# Patient Record
Sex: MALE | Race: WHITE | NOT HISPANIC OR LATINO | URBAN - METROPOLITAN AREA
[De-identification: names, ages, dates, MRNs, and addresses within clinical notes are randomized per-mention and may not be internally consistent; named-entity substitution may affect disease eponyms.]

---

## 2024-10-31 ENCOUNTER — HOSPITAL ENCOUNTER (OUTPATIENT)
Dept: RADIOLOGY | Facility: CLINIC | Age: 29
Discharge: HOME | End: 2024-10-31
Payer: COMMERCIAL

## 2024-10-31 ENCOUNTER — OFFICE VISIT (OUTPATIENT)
Dept: SPORTS MEDICINE | Facility: CLINIC | Age: 29
End: 2024-10-31
Payer: COMMERCIAL

## 2024-10-31 VITALS
SYSTOLIC BLOOD PRESSURE: 120 MMHG | WEIGHT: 160.05 LBS | DIASTOLIC BLOOD PRESSURE: 80 MMHG | BODY MASS INDEX: 22.41 KG/M2 | HEIGHT: 71 IN | HEART RATE: 70 BPM

## 2024-10-31 DIAGNOSIS — Z87.39 HISTORY OF HERNIATED INTERVERTEBRAL DISC: ICD-10-CM

## 2024-10-31 DIAGNOSIS — M54.50 LUMBOSACRAL PAIN: ICD-10-CM

## 2024-10-31 DIAGNOSIS — G57.02 PIRIFORMIS SYNDROME OF LEFT SIDE: ICD-10-CM

## 2024-10-31 DIAGNOSIS — M48.07 NEUROFORAMINAL STENOSIS OF LUMBOSACRAL SPINE: ICD-10-CM

## 2024-10-31 DIAGNOSIS — M21.70 LEG LENGTH DIFFERENCE, ACQUIRED: ICD-10-CM

## 2024-10-31 DIAGNOSIS — M99.03 LUMBAR REGION SOMATIC DYSFUNCTION: ICD-10-CM

## 2024-10-31 DIAGNOSIS — M54.50 LUMBAR PAIN: ICD-10-CM

## 2024-10-31 DIAGNOSIS — M62.9 HAMSTRING TIGHTNESS OF BOTH LOWER EXTREMITIES: ICD-10-CM

## 2024-10-31 DIAGNOSIS — M46.46 DISCITIS OF LUMBAR REGION: ICD-10-CM

## 2024-10-31 DIAGNOSIS — M51.369 BULGING LUMBAR DISC: ICD-10-CM

## 2024-10-31 DIAGNOSIS — M99.04 SACRAL REGION SOMATIC DYSFUNCTION: ICD-10-CM

## 2024-10-31 DIAGNOSIS — M54.42 ACUTE LEFT-SIDED LOW BACK PAIN WITH LEFT-SIDED SCIATICA: ICD-10-CM

## 2024-10-31 DIAGNOSIS — M99.04 SOMATIC DYSFUNCTION OF SACRAL SPINE: Primary | ICD-10-CM

## 2024-10-31 DIAGNOSIS — M25.552 LEFT HIP PAIN: ICD-10-CM

## 2024-10-31 DIAGNOSIS — S39.012A LUMBOSACRAL STRAIN, INITIAL ENCOUNTER: ICD-10-CM

## 2024-10-31 DIAGNOSIS — M24.559 HIP FLEXOR TENDON TIGHTNESS, UNSPECIFIED LATERALITY: ICD-10-CM

## 2024-10-31 DIAGNOSIS — M47.26 OSTEOARTHRITIS OF SPINE WITH RADICULOPATHY, LUMBAR REGION: ICD-10-CM

## 2024-10-31 DIAGNOSIS — S76.012A HIP STRAIN, LEFT, INITIAL ENCOUNTER: ICD-10-CM

## 2024-10-31 PROBLEM — M47.816 DEGENERATIVE JOINT DISEASE (DJD) OF LUMBAR SPINE: Status: ACTIVE | Noted: 2024-10-31

## 2024-10-31 PROCEDURE — 72170 X-RAY EXAM OF PELVIS: CPT

## 2024-10-31 PROCEDURE — 1036F TOBACCO NON-USER: CPT | Performed by: FAMILY MEDICINE

## 2024-10-31 PROCEDURE — 72110 X-RAY EXAM L-2 SPINE 4/>VWS: CPT

## 2024-10-31 PROCEDURE — 96372 THER/PROPH/DIAG INJ SC/IM: CPT | Performed by: FAMILY MEDICINE

## 2024-10-31 PROCEDURE — 3008F BODY MASS INDEX DOCD: CPT | Performed by: FAMILY MEDICINE

## 2024-10-31 PROCEDURE — 99214 OFFICE O/P EST MOD 30 MIN: CPT | Performed by: FAMILY MEDICINE

## 2024-10-31 PROCEDURE — 99204 OFFICE O/P NEW MOD 45 MIN: CPT | Performed by: FAMILY MEDICINE

## 2024-10-31 PROCEDURE — 2500000004 HC RX 250 GENERAL PHARMACY W/ HCPCS (ALT 636 FOR OP/ED): Performed by: FAMILY MEDICINE

## 2024-10-31 RX ORDER — KETOROLAC TROMETHAMINE 30 MG/ML
30 INJECTION, SOLUTION INTRAMUSCULAR; INTRAVENOUS ONCE
Status: COMPLETED | OUTPATIENT
Start: 2024-10-31 | End: 2024-10-31

## 2024-10-31 RX ORDER — METHYLPREDNISOLONE SODIUM SUCCINATE 125 MG/2ML
125 INJECTION INTRAMUSCULAR; INTRAVENOUS ONCE
Status: COMPLETED | OUTPATIENT
Start: 2024-10-31 | End: 2024-10-31

## 2024-10-31 RX ORDER — PREDNISONE 20 MG/1
TABLET ORAL
Qty: 30 TABLET | Refills: 0 | Status: SHIPPED | OUTPATIENT
Start: 2024-10-31 | End: 2024-11-09

## 2024-10-31 RX ORDER — METHYLPREDNISOLONE ACETATE 40 MG/ML
40 INJECTION, SUSPENSION INTRA-ARTICULAR; INTRALESIONAL; INTRAMUSCULAR; SOFT TISSUE ONCE
Status: COMPLETED | OUTPATIENT
Start: 2024-10-31 | End: 2024-10-31

## 2024-10-31 RX ORDER — CYCLOBENZAPRINE HCL 10 MG
10 TABLET ORAL NIGHTLY PRN
Qty: 20 TABLET | Refills: 0 | Status: SHIPPED | OUTPATIENT
Start: 2024-10-31 | End: 2024-11-20

## 2024-10-31 ASSESSMENT — ENCOUNTER SYMPTOMS
LOSS OF SENSATION IN FEET: 0
DEPRESSION: 0
OCCASIONAL FEELINGS OF UNSTEADINESS: 0

## 2024-10-31 ASSESSMENT — COLUMBIA-SUICIDE SEVERITY RATING SCALE - C-SSRS
6. HAVE YOU EVER DONE ANYTHING, STARTED TO DO ANYTHING, OR PREPARED TO DO ANYTHING TO END YOUR LIFE?: NO
1. IN THE PAST MONTH, HAVE YOU WISHED YOU WERE DEAD OR WISHED YOU COULD GO TO SLEEP AND NOT WAKE UP?: NO
2. HAVE YOU ACTUALLY HAD ANY THOUGHTS OF KILLING YOURSELF?: NO

## 2024-10-31 ASSESSMENT — PATIENT HEALTH QUESTIONNAIRE - PHQ9
SUM OF ALL RESPONSES TO PHQ9 QUESTIONS 1 AND 2: 0
2. FEELING DOWN, DEPRESSED OR HOPELESS: NOT AT ALL
1. LITTLE INTEREST OR PLEASURE IN DOING THINGS: NOT AT ALL

## 2024-11-15 ENCOUNTER — HOSPITAL ENCOUNTER (OUTPATIENT)
Dept: RADIOLOGY | Facility: CLINIC | Age: 29
Discharge: HOME | End: 2024-11-15
Payer: COMMERCIAL

## 2024-11-15 DIAGNOSIS — Z87.39 HISTORY OF HERNIATED INTERVERTEBRAL DISC: ICD-10-CM

## 2024-11-15 DIAGNOSIS — M21.70 LEG LENGTH DIFFERENCE, ACQUIRED: ICD-10-CM

## 2024-11-15 DIAGNOSIS — M54.50 LUMBOSACRAL PAIN: ICD-10-CM

## 2024-11-15 DIAGNOSIS — S39.012A LUMBOSACRAL STRAIN, INITIAL ENCOUNTER: ICD-10-CM

## 2024-11-15 DIAGNOSIS — G57.02 PIRIFORMIS SYNDROME OF LEFT SIDE: ICD-10-CM

## 2024-11-15 DIAGNOSIS — M99.04 SACRAL REGION SOMATIC DYSFUNCTION: ICD-10-CM

## 2024-11-15 DIAGNOSIS — M54.50 LUMBAR PAIN: ICD-10-CM

## 2024-11-15 DIAGNOSIS — M54.42 ACUTE LEFT-SIDED LOW BACK PAIN WITH LEFT-SIDED SCIATICA: ICD-10-CM

## 2024-11-15 DIAGNOSIS — M46.46 DISCITIS OF LUMBAR REGION: ICD-10-CM

## 2024-11-15 PROCEDURE — 72148 MRI LUMBAR SPINE W/O DYE: CPT

## 2024-12-02 ENCOUNTER — HOSPITAL ENCOUNTER (OUTPATIENT)
Dept: RADIOLOGY | Facility: HOSPITAL | Age: 29
Discharge: HOME | End: 2024-12-02
Payer: COMMERCIAL

## 2024-12-02 DIAGNOSIS — M54.42 ACUTE LEFT-SIDED LOW BACK PAIN WITH LEFT-SIDED SCIATICA: ICD-10-CM

## 2024-12-02 DIAGNOSIS — G57.02 PIRIFORMIS SYNDROME OF LEFT SIDE: ICD-10-CM

## 2024-12-02 DIAGNOSIS — S76.012A HIP STRAIN, LEFT, INITIAL ENCOUNTER: ICD-10-CM

## 2024-12-02 PROCEDURE — 27093 INJECTION FOR HIP X-RAY: CPT | Mod: LEFT SIDE | Performed by: RADIOLOGY

## 2024-12-02 PROCEDURE — 27093 INJECTION FOR HIP X-RAY: CPT | Mod: LT

## 2024-12-02 PROCEDURE — 77002 NEEDLE LOCALIZATION BY XRAY: CPT | Mod: LEFT SIDE | Performed by: RADIOLOGY

## 2024-12-02 PROCEDURE — 73722 MRI JOINT OF LWR EXTR W/DYE: CPT | Mod: LEFT SIDE | Performed by: RADIOLOGY

## 2024-12-02 PROCEDURE — A9575 INJ GADOTERATE MEGLUMI 0.1ML: HCPCS | Performed by: FAMILY MEDICINE

## 2024-12-02 PROCEDURE — 2550000001 HC RX 255 CONTRASTS: Performed by: FAMILY MEDICINE

## 2024-12-02 PROCEDURE — 73525 CONTRAST X-RAY OF HIP: CPT | Mod: LT

## 2024-12-02 RX ORDER — SODIUM CHLORIDE 0.9 % (FLUSH) 0.9 %
10 SYRINGE (ML) INJECTION EVERY 8 HOURS SCHEDULED
Status: DISCONTINUED | OUTPATIENT
Start: 2024-12-02 | End: 2024-12-03 | Stop reason: HOSPADM

## 2024-12-02 RX ORDER — GADOTERATE MEGLUMINE 376.9 MG/ML
0.2 INJECTION INTRAVENOUS
Status: COMPLETED | OUTPATIENT
Start: 2024-12-02 | End: 2024-12-02

## 2024-12-02 RX ORDER — SODIUM CHLORIDE 9 MG/ML
10 INJECTION, SOLUTION INTRAMUSCULAR; INTRAVENOUS; SUBCUTANEOUS EVERY 8 HOURS SCHEDULED
Status: CANCELLED | OUTPATIENT
Start: 2024-12-02

## 2024-12-04 ENCOUNTER — OFFICE VISIT (OUTPATIENT)
Dept: SPORTS MEDICINE | Facility: CLINIC | Age: 29
End: 2024-12-04
Payer: COMMERCIAL

## 2024-12-04 VITALS
SYSTOLIC BLOOD PRESSURE: 120 MMHG | BODY MASS INDEX: 27.09 KG/M2 | DIASTOLIC BLOOD PRESSURE: 80 MMHG | HEART RATE: 70 BPM | WEIGHT: 200 LBS | HEIGHT: 72 IN

## 2024-12-04 DIAGNOSIS — M47.817 FACET HYPERTROPHY OF LUMBOSACRAL REGION: ICD-10-CM

## 2024-12-04 DIAGNOSIS — M50.30 DISC-OSTEOPHYTE COMPLEX: ICD-10-CM

## 2024-12-04 DIAGNOSIS — M62.9 HAMSTRING TIGHTNESS OF BOTH LOWER EXTREMITIES: ICD-10-CM

## 2024-12-04 DIAGNOSIS — S39.012A LUMBOSACRAL STRAIN, INITIAL ENCOUNTER: ICD-10-CM

## 2024-12-04 DIAGNOSIS — M54.42 ACUTE LEFT-SIDED LOW BACK PAIN WITH LEFT-SIDED SCIATICA: ICD-10-CM

## 2024-12-04 DIAGNOSIS — M99.03 LUMBAR REGION SOMATIC DYSFUNCTION: ICD-10-CM

## 2024-12-04 DIAGNOSIS — M25.552 LEFT HIP PAIN: ICD-10-CM

## 2024-12-04 DIAGNOSIS — M25.78 DISC-OSTEOPHYTE COMPLEX: ICD-10-CM

## 2024-12-04 DIAGNOSIS — M54.50 LUMBOSACRAL PAIN: ICD-10-CM

## 2024-12-04 DIAGNOSIS — M48.07 NEUROFORAMINAL STENOSIS OF LUMBOSACRAL SPINE: ICD-10-CM

## 2024-12-04 DIAGNOSIS — S73.192D TEAR OF LEFT ACETABULAR LABRUM, SUBSEQUENT ENCOUNTER: Primary | ICD-10-CM

## 2024-12-04 DIAGNOSIS — M21.70 LEG LENGTH DIFFERENCE, ACQUIRED: ICD-10-CM

## 2024-12-04 DIAGNOSIS — M99.04 SOMATIC DYSFUNCTION OF SACRAL SPINE: ICD-10-CM

## 2024-12-04 DIAGNOSIS — M99.04 SACRAL REGION SOMATIC DYSFUNCTION: ICD-10-CM

## 2024-12-04 DIAGNOSIS — M25.852 FEMOROACETABULAR IMPINGEMENT OF LEFT HIP: ICD-10-CM

## 2024-12-04 DIAGNOSIS — S76.012A HIP STRAIN, LEFT, INITIAL ENCOUNTER: ICD-10-CM

## 2024-12-04 DIAGNOSIS — M47.26 OSTEOARTHRITIS OF SPINE WITH RADICULOPATHY, LUMBAR REGION: ICD-10-CM

## 2024-12-04 DIAGNOSIS — M54.50 LUMBAR PAIN: ICD-10-CM

## 2024-12-04 DIAGNOSIS — G57.02 PIRIFORMIS SYNDROME OF LEFT SIDE: ICD-10-CM

## 2024-12-04 DIAGNOSIS — M46.1 DEGENERATIVE JOINT DISEASE OF SACROILIAC REGION (CMS-HCC): ICD-10-CM

## 2024-12-04 DIAGNOSIS — M24.559 HIP FLEXOR TENDON TIGHTNESS, UNSPECIFIED LATERALITY: ICD-10-CM

## 2024-12-04 DIAGNOSIS — M51.369 BULGING LUMBAR DISC: ICD-10-CM

## 2024-12-04 DIAGNOSIS — M46.46 DISCITIS OF LUMBAR REGION: ICD-10-CM

## 2024-12-04 DIAGNOSIS — Z87.39 HISTORY OF HERNIATED INTERVERTEBRAL DISC: ICD-10-CM

## 2024-12-04 DIAGNOSIS — M48.061 SPINAL STENOSIS OF LUMBAR REGION WITHOUT NEUROGENIC CLAUDICATION: ICD-10-CM

## 2024-12-04 PROBLEM — S73.192A LABRAL TEAR OF LEFT HIP JOINT: Status: ACTIVE | Noted: 2024-12-04

## 2024-12-04 ASSESSMENT — PATIENT HEALTH QUESTIONNAIRE - PHQ9
1. LITTLE INTEREST OR PLEASURE IN DOING THINGS: NOT AT ALL
SUM OF ALL RESPONSES TO PHQ9 QUESTIONS 1 AND 2: 0
2. FEELING DOWN, DEPRESSED OR HOPELESS: NOT AT ALL

## 2024-12-04 ASSESSMENT — ENCOUNTER SYMPTOMS
LOSS OF SENSATION IN FEET: 0
OCCASIONAL FEELINGS OF UNSTEADINESS: 0
DEPRESSION: 0

## 2024-12-04 ASSESSMENT — PAIN SCALES - GENERAL
PAINLEVEL_OUTOF10: 6
PAINLEVEL_OUTOF10: 6

## 2024-12-04 ASSESSMENT — COLUMBIA-SUICIDE SEVERITY RATING SCALE - C-SSRS
2. HAVE YOU ACTUALLY HAD ANY THOUGHTS OF KILLING YOURSELF?: NO
6. HAVE YOU EVER DONE ANYTHING, STARTED TO DO ANYTHING, OR PREPARED TO DO ANYTHING TO END YOUR LIFE?: NO
1. IN THE PAST MONTH, HAVE YOU WISHED YOU WERE DEAD OR WISHED YOU COULD GO TO SLEEP AND NOT WAKE UP?: NO

## 2024-12-04 ASSESSMENT — PAIN DESCRIPTION - DESCRIPTORS: DESCRIPTORS: ACHING;THROBBING;SHARP;SHOOTING

## 2024-12-04 ASSESSMENT — PAIN - FUNCTIONAL ASSESSMENT: PAIN_FUNCTIONAL_ASSESSMENT: 0-10

## 2024-12-04 NOTE — PROGRESS NOTES
Verbal consent of the patient and/or verbal parental consent for patients under the age of 18 have been obtained to conduct a physical examination at this office visit.    New patient  History Of Present Illness  12/04/24 Zhang Gilliam is a 29 y.o. male who presents for MRI review of their  Lumbar spine and left hip states that the low back is feeling much better than when he was last seen. States that Left Hip is feeling bad today, 6/10 pain. States that he is trying to schedule PT with Kranthi PINO at [facility]. States that the medication from the last visit helped a lot. States that he feels work is going well and is able to complete full days. No new or worsening symptoms today. States he continues to wear back brace at work.   Based off of how he is doing clinically it seems like he is having more issues with his hip than his lumbar spine.  He says periodically he is getting some vague pain in his lower back however the majority of pain is in his hip deep in the groin and he describes his hip internally rotating that causes him the most issues.  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    All previous Progress Notes and imaging results related to this patients chief complaint have been reviewed in preparation for this examination.    Past Medical History  He has no past medical history on file.    Surgical History  He has no past surgical history on file.     Social History  He reports that he has never smoked. He has never used smokeless tobacco. No history on file for alcohol use and drug use.    Family History  No family history on file.     Allergies  Patient has no known allergies.    Review of Systems  CONSTITUTIONAL:   Negative for weight change, loss of appetite, fatigue, weakness, fever, chills, night sweats, headaches .           HEENT:   Negative for cold, cough, sore throat, sinus pain, swollen lymph nodes.           OPHTHALMOLOGY:   Negative for  diminished vision, blurred vision, loss of vision, double vision.           ALLERGY:   Negative for runny nose, scratchy throat, sinus congestion, rash, facial pressure, nasal congestion, post-nasal drip.           CARDIOLOGY:   Negative for chest pain, palpitations, murmurs, irregular heart beat, shortness of breath, leg edema, dyspnea on exertion, fatigue, dizziness.           RESPIRATORY:   Negative for chest pain, shortness of breath, swelling of the legs, asthma/copd, chest congestion, pain with breathing .           GASTROENTEROLOGY:   Negative for nausea, vomitting, heartburn, constipation, diarrhea, blood in stool, change in bowel habits, black stool.           HEMATOLOGY/LYMPH:   Negative for fatigue, loss of appetitie, easy bruising, easy bleeding, anemia, abnormal bleeding, slow healing.           ENDOCRINOLOGY:   Negative for polyuria, polydipsia, polyphagia, fatigue, weight loss, weight gain, cold intolerance, heat intolerance, diabetes.           MUSCULOSKELETAL:   Positive  for Lumbar pain.       DERMATOLOGY:   Negative for rash, bruising.           NEUROLOGY:   Negative for tingling, numbness, gait abnormality, paresthesias, weakness, sciatica.        Examination: All findings improved tremendously and lumbar spine since last visit  Left Lumbar Spine Posterior L4 and L5 with Radiculitis     Edema: Negative.   TART Findings: Positive  Tissue Texture Changes, Asymmetry, Restriction, Tenderness paraspinal muscles lower lumbar spine.   Ecchymosis/Bruising: Negative.   Percussion Test (LUMBAR): Negative.   Tuning Fork Test (LUMBAR): Negative.   Percussion (Sacrum): Negative.   Tuning Fork (Sacrum): Negative.     Orientation:All findings improved tremendously and lumbar spine since last visit  Orientation (LUMBAR): Positive  Decreased Lumbar Lordosis due to muscle spasms.   Orientation (Sacrum):  Positive  Decreased Sacral Flexion/Extension.     ROM (LUMBAR):   Positive  Decreased due to pain, Forward  Flexion, Extension, Lateral Bending (Side Bending), and Twisting (Rotation).     Sacrum:All findings improved tremendously and lumbar spine since last visit  Standing Flexion Test: Positive  Left  Seated Flexion Test: Positive Left  Spring Test: Negative   Sacral Somatic Dysfunction: Positive RrRa: Right rotation Right axis  Hip Flexor Tightness: Positive left greater than right  Hamstring Tightness: Positive right greater than left             Muscle Strength: All findings improved tremendously and lumbar spine since last visit  +5/+5 Hamstring Flexion  +5/+5 Quadricep Extension  +5/+5 Hip Flexion  +5/+5 Hip Extension  +5/+5 Hip ABduction toward body  +5/+5 Hip ADduction away from body  +5/+5 Hip Internal Rotation at 90 Degrees  +5/+5 Hip External Rotation at 90 Degrees  +5/+5 Hip Internal Rotation at 0 Degrees  +5/+5 Hip External Rotation at 0 Degrees.            DTR/Neurological: 2-Point Discrimination:  Negative,Toe Walk normal,Heel Walk normal   +2/+4 Patellar Reflex (L-4)  +2/+4 Posterior Tibialis and Medial Hamstrings Reflex (L5)  +2/+4 Achilles Reflex (S-1).            Sensation/Neurological Lumbar: All findings improved tremendously and lumbar spine since last visit  Negative Sensation Intact, 2-Point Discrimination Negative   Negative L1: Low back, hips, and groin  Negative L2: Low back and front of inside of upper leg/thigh  Negative L3: Low back and front of upper leg/thigh  Negative L4: Low back, front of upper leg/thigh, front of lower leg/calf, front of medial area of knee, and inside of ankle  Negative L5: Low back, front and lateral knee, front and outside of lower leg/calf, top and bottom of foot and first four toes especially big toe.            Sensation/Neurological Sacrum: All findings improved tremendously and lumbar spine since last visit  Negative  Sensation Intact, 2 -Point Discrimination Test: Negative   Negative S1: low back, back of upper leg/thigh, back and inside of lower leg, calf and  little toe  Negative S2: Buttocks, genitals, back of upper leg/thigh and calves  Negative S3: Buttocks and genitals  Negative S4: Buttocks  Negative S5: Buttocks.     Sensation/Neurological Coccygeal:   Negative Sensation Intact, 2-Point Discrimination: Negative   Negative Coccyx: Buttocks and area of tailbone.     Palpation: Positive  Improved significantly since last visit Tender to Palpation over the Left Lumbar Spine as well as the Paraspinal Musculature, and SI joint All findings improved tremendously and lumbar spine since last visit           Vascular:   Capillary Refill < 2 seconds  +2/+4Carotid  +2/+4 Dorsalis Pedis  +2/+4 Posterior Tibial.                Low Back-Disc Injury:All findings improved tremendously and lumbar spine since last visit  Valsalva Maneuver: Negative  .   Fermoral Nerve Traction Test: Negative   Slump Test: Negative    Cross Test Seated: Negative    Seated Straight Leg Raise: Negative    L[place] Sign: Negative    [place] Differential Test: Negative    [place] Drop Test: Negative    Seated [place] Test: Negative     Reverse [place] Test: Negative    Tip Toe Heel Walking Test: Negative.   Shane Prone Knee Flexion Test: Negative      Multifidus Toe Touch Test (MT3): Negative.  Prone Instability Test (PIT): Negative  Multifidus Lift Test (MLT): Negative       Low Back-SI Joint: All findings improved tremendously and lumbar spine since last visit  Three-Phase Hyperextension Test: Positive    Yeoman Test: Negative.   Sacroilliac Stress Test: Positive     Abduction Stress Test: Positive            Low Back-Spondy: All findings improved tremendously and lumbar spine since last visit   Stork Test: Positive  .   Sphinx Test: Positive     Modified Sphinx Test: Positive  .            Leg Length:  Leg Length Supine: Positive LEFT leg shorter than the Right verified with standing erect pelvis x-ray  Leg Length Supine to Seated (Derbolowsky Sign): Positive LEFT leg shorter than the Right    verified with standing erect pelvis x-ray    Feet/Foot:   Positive  BILATERAL Valgus foot           Hip/Pelvis - Impingement/Labrum: All findings of the hip relatively unchanged and recently reaggravated  VARSHA Test:  Positive         FADIR Test:  Positive        Hip Scouring Test:  Positive        Harris's Test:  Positive        Posterior Labrum (Posterior Margin) Test:  Positive        Posterior Impingement (Posterior Labrum) [Torque] Test:  Positive        Anterior Impingement (Anterior Labrum) Test:  Positive        Psoas Sign: Positive                  Imaging and Diagnostics Review:  Left leg shorter than right leg by the following:  Iliac crest:  9.5 mm  Sacral base:  6.4 mm  Midline femoral heads:  4.8 mm  Median difference of the left leg being shorter than right leg is approximately:  6.9 mm     MR arthrogram hip left  Order date: 12/2/2024  Authorizing: Monico Wolff DO  Ordered by Monico Wolff DO on 12/2/2024.     Narrative & Impression    Interpreted By:  Sissy Barkley,   STUDY:  MR ARTHROGRAM HIP LEFT; ;  12/2/2024 3:00 pm      INDICATION:  Signs/Symptoms:left hip pain.      ,M54.42 Lumbago with sciatica, left side,G57.02 Lesion of sciatic  nerve, left lower limb,S76.012A Strain of muscle, fascia and tendon  of left hip, initial encounter      COMPARISON:  None.      ACCESSION NUMBER(S):  [id number]      ORDERING CLINICIAN:  MONICO WOLFF      TECHNIQUE:  Multiplanar multisequence MRI obtained of the left hip status post  arthrogram.      FINDINGS:  Left hip acetabular labrum demonstrates multidirectional tear  involving portions of the anterosuperior acetabular labrum with areas  of longitudinal peripheral tear as well as detachment. For example,  series 801 image [id number]. Component of longitudinal peripheral tear  extends from this area to near the 12 o'clock position as well as  partial detachment extending into the posterosuperior labrum. Extent  of labrum tear 3.9 cm along the acetabular  rim. Of note, there is  paralabral cyst formation near the 12 o'clock position extending into  the adjacent posterosuperior labrum with area of paralabral cyst  formation identified measuring 19 x 7 mm in the AP and transverse  dimension respectively. Remainder of the visualized acetabular labrum  is otherwise unremarkable. There is a sublabral sulcus of the  posteroinferior labrum.      Femoral head contour demonstrates component mild lateralization of  the femoral head junction along superior margin of the femoral head  neck junction. Alpha angle measuring 56 degrees. Ligamentum teres is  unremarkable. Transverse acetabular ligament are unremarkable. No  focal marrow edema in the left femoral head. No subchondral cystic  change demonstrated. No focal marrow edema. Corresponding acetabulum  demonstrates no focal marrow edema.      Left iliopsoas tendon is unremarkable. No iliopsoas bursitis. Gluteus  minimus tendon and muscle are unremarkable pre gluteus medius tendon  and muscle are unremarkable. There is no trochanteric bursitis      Musculature about the left hip demonstrates no asymmetric atrophy or  edema. Left common hamstring tendon origin is unremarkable      Included portions visualized osseous pelvis demonstrates no focal  marrow edema. Included portions visualized sacrum demonstrate mild  subcortical cystic change about the SI joint with mild osteoarthritic  degenerative change noted. Included portions visualized soft tissue  pelvis is unremarkable.      IMPRESSION left hip MR arthrogram December 2, 2024:  1. 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 There is a focal bump along superior femoral head neck  junction demonstrated. Corresponding tear of the anterosuperior to  portions of the posterosuperior acetabular labrum identified  measuring 3.9 cm along the acetabular rim. Paralabral cyst  formation  noted.  2, LEFT SI DJD          MACRO:  None      Signed by: Sissy Barkley 12/2/2024 3:43 PM  Dictation workstation:   [id number]   MR lumbar spine wo IV contrast  Status: Final result     Narrative & Impression   Interpreted By:  Rosalie Marinelli,   STUDY:  MR LUMBAR SPINE WO IV CONTRAST;  11/15/2024 5:16 pm      INDICATION:  Signs/Symptoms:RADICULOPATHY PAIN.      COMPARISON:  07/08/2020.      ACCESSION NUMBER(S):  [id number]      ORDERING CLINICIAN:  YOKO MCDANIEL      TECHNIQUE:  Sagittal T1, T2, STIR, axial T1 and T2 weighted images of the lumbar  spine were acquired.      FINDINGS:  Alignment: The vertebral alignment is maintained.      Vertebrae/Intervertebral Discs: The vertebral bodies demonstrate  expected height. The marrow signal is within normal limits. Disc  desiccation involving all levels of the lumbar spine, particularly  involving  L4 5, L5-S1.      Conus: The lower thoracic cord appears unremarkable. The conus  terminates at T12-L1.      T12-L1: No disc bulging, disc protrusion. No central spinal stenosis  or neural foramina narrowing. Posterior elements are intact.      L1-2: No disc bulging, disc protrusion. No central spinal stenosis or  neural foramina narrowing. Posterior elements are intact.      L2-3: No disc bulging, disc protrusion. No central spinal stenosis or  neural foramina narrowing. Posterior elements are intact.      L3-4: Mild broad-based posterior disc osteophyte complex,  mild-to-moderate hypertrophy of the facet joints, causing mild to  moderate deformity of the thecal sac. Mild to moderate bilateral  neural foramina narrowing, causing mild encroachment of the exiting  nerve roots.      L4-5: Mild to moderate broad-based posterior disc osteophyte complex,  mild-to-moderate hypertrophy of the facet joints, thickened  ligamentum flavum, causing mild to moderate deformity of the thecal  sac. Moderate bilateral neural foramina narrowing, causing mild  encroachment of  the exiting nerve roots.      L5-S1: Mild broad-based posterior disc osteophyte complex,  mild-to-moderate hypertrophy of the facet joints, causing mild  deformity of the thecal sac. Mild bilateral neural foramina  narrowing, no encroachment of the exiting nerve roots.          IMPRESSION MRI lumbar spine November 18, 2024:  Mild-to-moderate central spinal stenosis, bilateral neural foraminal  narrowing, at  L5-S1, causing mild-to-moderate deformity of the  thecal sac, and mild encroachment of the bilateral exiting nerve  roots. No significant changes.  2. Mild central spinal stenosis, mild-to-moderate bilateral neural  foramina narrowing at L3-4, causing mild encroachment of the  bilateral exiting nerve roots.  3. Multilevel of disc osteophyte complex (disc protrusion and bone oneophyte) L3-L4; L4-L5; L5-S1  4. Multilevel facet hypertrophy L3-S1          Signed by: Rosalie Marinelli 11/18/2024 10:14 AM  Dictation workstation:   [id number]           Assessment   1. Tear of left acetabular labrum, subsequent encounter        2. Acute left-sided low back pain with left-sided sciatica  Referral to Physical Therapy    CANCELED: Referral to Physical Therapy      3. Lumbar pain  Referral to Physical Therapy    CANCELED: Referral to Physical Therapy      4. Lumbosacral pain  Referral to Physical Therapy    CANCELED: Referral to Physical Therapy      5. Lumbosacral strain, initial encounter  Referral to Physical Therapy    CANCELED: Referral to Physical Therapy      6. Sacral region somatic dysfunction  Referral to Physical Therapy    CANCELED: Referral to Physical Therapy      7. Discitis of lumbar region  Referral to Physical Therapy    CANCELED: Referral to Physical Therapy      8. Piriformis syndrome of left side  Referral to Physical Therapy    CANCELED: Referral to Physical Therapy      9. Leg length difference, acquired  Referral to Physical Therapy    CANCELED: Referral to Physical Therapy      10. Left hip pain   Referral to Physical Therapy    CANCELED: Referral to Physical Therapy      11. History of herniated intervertebral disc  Referral to Physical Therapy    CANCELED: Referral to Physical Therapy      12. Hip strain, left, initial encounter  Referral to Physical Therapy    CANCELED: Referral to Physical Therapy      13. Somatic dysfunction of sacral spine  Referral to Physical Therapy    CANCELED: Referral to Physical Therapy      14. Lumbar region somatic dysfunction  Referral to Physical Therapy    CANCELED: Referral to Physical Therapy      15. Hip flexor tendon tightness, unspecified laterality  Referral to Physical Therapy    CANCELED: Referral to Physical Therapy      16. Bulging lumbar disc  Referral to Physical Therapy    CANCELED: Referral to Physical Therapy      17. Hamstring tightness of both lower extremities  Referral to Physical Therapy    CANCELED: Referral to Physical Therapy      18. Neuroforaminal stenosis of lumbosacral spine  Referral to Physical Therapy    CANCELED: Referral to Physical Therapy      19. Osteoarthritis of spine with radiculopathy, lumbar region  Referral to Physical Therapy    CANCELED: Referral to Physical Therapy      20. Femoroacetabular impingement of left hip        21. Degenerative joint disease of sacroiliac region (CMS-HCC)        22. Disc-osteophyte complex        23. Facet hypertrophy of lumbosacral region        24. Spinal stenosis of lumbar region without neurogenic claudication              Treatment or Intervention:  May continue to alternate ice and moist heat as needed  ,   Reviewed herniated/bulging disc(s) injury in detail with the patient to the level of their understanding at the time of this office visit.  ,    Reviewed labral tear as well as femoral acetabular impingement syndrome especially of cam morphology  Start into physical therapy 1-2 times a week for 10-12 weeks with manual therapy, dry needling, IASTM, and traction ,   Reviewed home stretching  exercises to be performed by the patient routinely  ,   Stressed the importance of wearing shoes with good stability control to help with the biomechanics affecting the spine  ,   Stressed the importance of wearing full foot insoles to help with the biomechanics affecting the spine and to provide cushioning  ,   Recommendation over-the-counter vitamin-D 2 -3000+ milligrams a day, as well as  a daily multivitamin.  ,   Recommendation over-the-counter curcumin, turmeric, boswellia, as well as egg shell membrane as directed to aid with joint inflammation.  ,   Recommendation over-the-counter Move Free for joint health.  ,   May take OTC Tylenol Extra Strength or OTC Tylenol Arthritis, taking one every 6-8 hours with food as needed for pain management, Patient advised regarding the risks and/or potential adverse reactions and/or side effects of any prescribed medications along with any over-the-counter medications or any supplements used. Patient advised to seek immediate medical care if any adverse reactions occur. The patient and/or patient(s) parent(s) verbalized their understanding  ,   Patient given 6.0 millimeter heel lift to be placed in the left shoe to accommodate for leg length discrepancy found on standing erect pelvis xray , and may recommend custom orthotics with built in 6.9 mm heel lift in the left shoe  Reviewed Lumbar MRI of lumbar spine and MR arthrogram of the left hip in detail with the patient and/or patients parent/legal guardian to their level of understanding; a copy of these results were provided to the patient and/or patients parent/legal guardian at the time of this office visit.   Possibility of regenerative injections left hip in the future  Possibility of corticosteroid injections left hip in the future  If needed possibly referral to physical medicine and rehab or pain management for epidural injections however against this at this time because feels like it is mostly coming from the  hip  Possibility referral to orthopedics for surgical evaluation for the left hip however he is against this at this time and would like to pursue just physical therapy or possibility of injections down the line  Follow-up mid to end of January or sooner if 1 of start injections in the left hip     please note that this report has been produced using speech recognition software.  It may contain errors related to grammar, punctuation or spelling.  Electronically signed, but not reviewed.  SIOBHAN Diallo, Director of Sports Medicine      YOKO MCDANIEL on 12/4/24 at 6:14 PM.     ALVERTO Diallo DO

## 2024-12-16 ENCOUNTER — EVALUATION (OUTPATIENT)
Dept: PHYSICAL THERAPY | Facility: CLINIC | Age: 29
End: 2024-12-16
Payer: COMMERCIAL

## 2024-12-16 DIAGNOSIS — M47.26 OSTEOARTHRITIS OF SPINE WITH RADICULOPATHY, LUMBAR REGION: ICD-10-CM

## 2024-12-16 DIAGNOSIS — M25.552 LEFT HIP PAIN: Primary | ICD-10-CM

## 2024-12-16 DIAGNOSIS — M46.46 DISCITIS OF LUMBAR REGION: ICD-10-CM

## 2024-12-16 DIAGNOSIS — M48.07 NEUROFORAMINAL STENOSIS OF LUMBOSACRAL SPINE: ICD-10-CM

## 2024-12-16 DIAGNOSIS — M54.50 LUMBOSACRAL PAIN: ICD-10-CM

## 2024-12-16 DIAGNOSIS — M99.04 SACRAL REGION SOMATIC DYSFUNCTION: ICD-10-CM

## 2024-12-16 DIAGNOSIS — M99.04 SOMATIC DYSFUNCTION OF SACRAL SPINE: ICD-10-CM

## 2024-12-16 DIAGNOSIS — Z87.39 HISTORY OF HERNIATED INTERVERTEBRAL DISC: ICD-10-CM

## 2024-12-16 DIAGNOSIS — M51.369 BULGING LUMBAR DISC: ICD-10-CM

## 2024-12-16 DIAGNOSIS — M62.9 HAMSTRING TIGHTNESS OF BOTH LOWER EXTREMITIES: ICD-10-CM

## 2024-12-16 DIAGNOSIS — G57.02 PIRIFORMIS SYNDROME OF LEFT SIDE: ICD-10-CM

## 2024-12-16 DIAGNOSIS — M21.70 LEG LENGTH DIFFERENCE, ACQUIRED: ICD-10-CM

## 2024-12-16 DIAGNOSIS — M54.50 LUMBAR PAIN: ICD-10-CM

## 2024-12-16 DIAGNOSIS — S76.012A HIP STRAIN, LEFT, INITIAL ENCOUNTER: ICD-10-CM

## 2024-12-16 DIAGNOSIS — S39.012A LUMBOSACRAL STRAIN, INITIAL ENCOUNTER: ICD-10-CM

## 2024-12-16 DIAGNOSIS — M99.03 LUMBAR REGION SOMATIC DYSFUNCTION: ICD-10-CM

## 2024-12-16 DIAGNOSIS — M24.559 HIP FLEXOR TENDON TIGHTNESS, UNSPECIFIED LATERALITY: ICD-10-CM

## 2024-12-16 DIAGNOSIS — M54.42 ACUTE LEFT-SIDED LOW BACK PAIN WITH LEFT-SIDED SCIATICA: ICD-10-CM

## 2024-12-16 PROCEDURE — 97110 THERAPEUTIC EXERCISES: CPT | Mod: GP

## 2024-12-16 PROCEDURE — 97161 PT EVAL LOW COMPLEX 20 MIN: CPT | Mod: GP

## 2024-12-16 ASSESSMENT — PAIN DESCRIPTION - DESCRIPTORS: DESCRIPTORS: ACHING

## 2024-12-16 ASSESSMENT — PAIN - FUNCTIONAL ASSESSMENT: PAIN_FUNCTIONAL_ASSESSMENT: 0-10

## 2024-12-16 NOTE — PROGRESS NOTES
Physical Therapy Evaluation    Patient Name: Zhang Gilliam  MRN: [id number]  Evaluation Date: 12/16/2024  Time Calculation  Start Time: 1615  Stop Time: 1655  Time Calculation (min): 40 min  PT Evaluation Time Entry  PT Evaluation (Low) Time Entry: 30     PT Therapeutic Procedures Time Entry  Therapeutic Exercise Time Entry: 10    Problem List Items Addressed This Visit             ICD-10-CM    Lumbosacral pain M54.50    Lumbar pain M54.50    Lumbosacral strain S39.012A    Sacral region somatic dysfunction M99.04    Discitis of lumbar region M46.46    Piriformis syndrome of left side G57.02    History of herniated intervertebral disc Z87.39    Leg length difference, acquired M21.70    Left hip pain - Primary M25.552    Relevant Orders    Follow Up In Physical Therapy    Hip strain, left, initial encounter S76.012A    Lumbar region somatic dysfunction M99.03    Hamstring tightness of both lower extremities M62.9    Hip flexor tendon tightness, unspecified laterality M24.559    Bulging lumbar disc M51.369    Neuroforaminal stenosis of lumbosacral spine M48.07    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 Back pain comes and goes but is typically lower level, but hip pain is more severe. Pain built up over time, and pt notices reduced ROM. Pt works as a  and by the end of the day pain worsens. Walking, lifting, and stairs all worsen symptoms. Pt saw Dr. Wolff and had MRIs, results in chart review. Pt considering injections, and PT ordered in the meantime to address pain and dysfunction.  "    Precautions:  Precautions  Precautions Comment: None    Relevant PMH:  None    Red Flags: Do you have any of the following? No  Fever/chills, unexplained weight changes, dizziness/fainting, unexplained change in bowel or bladder functions, unexplained malaise or muscle weakness, night pain/sweats, numbness or tingling    Pain:  Pain Assessment: 0-10  0-10 (Numeric) Pain Score:  (Pain in L hip 0/10, pain at max in L hip 7/10. Low back 0/10, pain at max 7/10)  Pain Descriptors: Aching  Pain Frequency: Intermittent  Effect of Pain on Daily Activities: Pain present with prolonged sitting, hip ROM, standing, walking, stairs, lifitng, work tasks    Home Living:  Home type: House  Stairs: Yes  Lives with: Family  Occupation: full time job doing ,   Work tasks: sitting, standing, walking, lifting     Prior Function Per Pt/Caregiver Report:  Prior Function Comments: Functionally independent without restriction    Patient's Goal for Therapy:  Reduce pain, regain function    OBJECTIVE:  Objective   Posture:  Posture Comment: Grossly WNL  Range of Motion:  Lumbar AROM full, pain only with R sidebend    B hip AROM full and symmetric however pain with L IR>ER>flexion    Strength:  Hip MMT L R   Hip Flexion 4+/5 5/5   Hip Extension 4+/5 5/5   Hip Abduction 4+/5 5/5   Hip Adduction 4+/5 5/5   External Rotation 4+/5 5/5   Internal Rotation 4+/5 5/5   Holds SL bridge on R 20\" without instability, 10\" on L with mild pelvic drop, mild pain    Flexibility:  Flexibility Comment: Might tightness L piriformis, hamstrings  Palpation:  Palpation Comment: No TTP today  Special Tests:  Special Tests Comment: Positive L scour VARSHA. Negative slump, SLR  Gait:  Gait Comment: No deviations in clinic; pain increases with distances throughout the day and with uneven surfaces  Stairs:  Stairs Comment: Performs reciprocally with intermitent pain  Bed Mobility:  Bed Mobility Comment: Independent  Transfers:  Transfers Comment: " Independent    Outcome Measures:  Other Measures  Oswestry Disablity Index (RAUDEL): 10     Assessment  PT Assessment Results: Decreased strength, Decreased range of motion, Decreased mobility, Pain  Rehab Prognosis: Good    Pt is a 29 y.o. male who presents with impairments listed above. These impairments have led to functional limitations including intermittent pain with ADLs as well as with sitting, standing, walking, stairs, lifting, and work tasks. Pt would benefit from skilled physical therapy intervention to improve above impairments and facilitate return to function.    Complexity of Evaluation: Low    Based on the history including personal factors and/or comorbidities, examination of body systems including body structures and function, activity limitations, and/or participation restrictions, as well as clinical presentation, patient meets criteria for above complexity evaluation.    Plan  Treatment/Interventions: Dry needling, Electrical stimulation, Manual therapy, Neuromuscular re-education, Mechanical traction, Taping techniques, Therapeutic activities, Therapeutic exercises, Ultrasound  PT Plan: Skilled PT  PT Frequency: 1 time per week  Duration: 8 visits  Certification Period Start Date: 12/16/24  Certification Period End Date: 03/16/25  Number of Treatments Authorized: MN  Rehab Potential: Good  Plan of Care Agreement: Patient    Insurance Plan: Payor: [employer] [facility] / Product Type: *No Product type* /     Plan for next visit: assess response to HEP, hip mobilizations/manual techniques for ROM and pain, progress hip stability     OP EDUCATION:  Outpatient Education  Individual(s) Educated: Patient  Education Provided: Anatomy, Body Mechanics, Home Exercise Program, POC  Diagnosis and Precautions: L hip pain, low back pain  Risk and Benefits Discussed with Patient/Caregiver/Other: yes  Patient/Caregiver Demonstrated Understanding: yes  Plan of Care Discussed and Agreed Upon: yes  Patient  "Response to Education: Patient/Caregiver Performed Return Demonstration of Exercises/Activities, Patient/Caregiver Asked Appropriate Questions, Patient/Caregiver Verbalized Understanding of Information    Today's Treatment:  Therapeutic Exercise  HEP issued, demonstrated, instructed pt in exercises, exercises include:  Access Code: [email or web address]  URL: [email or web address]  Date: 12/16/2024  Prepared by: Shai Palmer    Exercises  - Supine Single Knee to Chest Stretch  - 1 x daily - 7 x weekly - 1 sets - 3 reps - 30 second hold  - Supine Piriformis Stretch with Leg Straight  - 1 x daily - 7 x weekly - 1 sets - 3 reps - 30 second hold  - Hooklying Gluteal Sets  - 1 x daily - 7 x weekly - 2 sets - 10 reps - 5 second hold  - Supine Hip Adduction Isometric with Ball  - 1 x daily - 7 x weekly - 2 sets - 10 reps - 3 second  hold  - Hooklying Clamshell with Resistance  - 1 x daily - 7 x weekly - 2 sets - 10 reps - 5 second hold    Goals:  Active       PT Problem       STG, 4 visits:       Start:  12/16/24    Expected End:  01/30/25       Pt will be independent in HEP to improve LE and core strength, mobility, and function.  Pt will report 50% reduction in pain with functional mobility such as standing, walking, lifting, and at the end of the work day.           Pt will increase strength in L hip by 1/2 MMT in all planes for improved performance of functional mobility.         Start:  12/16/24    Expected End:  03/16/25            Pt will maintain SL bridge for >20\" on LLE without pain or core instability for improved lumbopelvic stability during functional tasks.        Start:  12/16/24    Expected End:  03/16/25            Pt will demonstrate full L hip AROM without pain in all planes for improved performance of ADLs.         Start:  12/16/24    Expected End:  03/16/25            Pt will ambulate long community distances across all surfaces without deviation and without pain.        Start:  12/16/24    Expected End:  " 03/16/25            Pt will perform moderate weight lifting tasks with appropriate body mechanics and without pain for improved tolerance to work tasks.         Start:  12/16/24    Expected End:  03/16/25            Pt will tolerate >2 hours of sitting and standing activity with appropriate posture and without pain for improved tolerance to work tasks.        Start:  12/16/24    Expected End:  03/16/25

## 2025-01-13 ENCOUNTER — APPOINTMENT (OUTPATIENT)
Dept: SPORTS MEDICINE | Facility: CLINIC | Age: 30
End: 2025-01-13

## 2025-01-17 NOTE — PROGRESS NOTES
Physical Therapy Treatment    Patient Name: Zhang Gilliam  MRN: [id number]  Encounter date:  1/20/2025                Visit Number:  2 (including evaluation)  Planned total visits:    Visits Authorized/Insurance Coverage:  NO AUTH, [address]ED, 90/10 [place], [place],  [address],  [employer] @ [facility] REF# [id number]     Current Problem  Problem List Items Addressed This Visit    None        Precautions       Pain       Subjective  ***    Objective  ***    Treatment:  {PT Treatments:26578}  ***    Current HEP:  Access Code: [email or web address]  URL: [email or web address]  Date: 12/16/2024  Prepared by: Shai Palmer    Exercises  - Supine Single Knee to Chest Stretch  - 1 x daily - 7 x weekly - 1 sets - 3 reps - 30 second hold  - Supine Piriformis Stretch with Leg Straight  - 1 x daily - 7 x weekly - 1 sets - 3 reps - 30 second hold  - Hooklying Gluteal Sets  - 1 x daily - 7 x weekly - 2 sets - 10 reps - 5 second hold  - Supine Hip Adduction Isometric with Ball  - 1 x daily - 7 x weekly - 2 sets - 10 reps - 3 second  hold  - Hooklying Clamshell with Resistance  - 1 x daily - 7 x weekly - 2 sets - 10 reps - 5 second hold    Has patient been consistent with performance of HEP? {Yes/No:50905}    Assessment:  Pt's response to treatment:  ***  Areas of improvements:  ***  Limitations/deficits:  ***    Pain end of session: ***    Plan:     {BASPLAN:86982}    Assessment of current progress against goals:  {BASPTNOTEGOALASSESSMENT:25478}    Goals:  Active       PT Problem       STG, 4 visits:       Start:  12/16/24    Expected End:  01/30/25       Pt will be independent in HEP to improve LE and core strength, mobility, and function.  Pt will report 50% reduction in pain with functional mobility such as standing, walking, lifting, and at the end of the work day.           Pt will increase strength in L hip by 1/2 MMT in all planes for improved performance of functional mobility.         Start:  12/16/24    Expected End:  03/16/25             "Pt will maintain SL bridge for >20\" on LLE without pain or core instability for improved lumbopelvic stability during functional tasks.        Start:  12/16/24    Expected End:  03/16/25            Pt will demonstrate full L hip AROM without pain in all planes for improved performance of ADLs.         Start:  12/16/24    Expected End:  03/16/25            Pt will ambulate long community distances across all surfaces without deviation and without pain.        Start:  12/16/24    Expected End:  03/16/25            Pt will perform moderate weight lifting tasks with appropriate body mechanics and without pain for improved tolerance to work tasks.         Start:  12/16/24    Expected End:  03/16/25            Pt will tolerate >2 hours of sitting and standing activity with appropriate posture and without pain for improved tolerance to work tasks.        Start:  12/16/24    Expected End:  03/16/25              "

## 2025-01-20 ENCOUNTER — APPOINTMENT (OUTPATIENT)
Dept: PHYSICAL THERAPY | Facility: CLINIC | Age: 30
End: 2025-01-20
Payer: COMMERCIAL

## 2025-01-31 NOTE — PROGRESS NOTES
"    Physical Therapy Treatment    Patient Name: Zhang Gilliam  MRN: [id number]  Encounter date:  2/3/2025  Time Calculation  Start Time: 1713  Stop Time: 1758  Time Calculation (min): 45 min     PT Therapeutic Procedures Time Entry  Manual Therapy Time Entry: 17  Therapeutic Exercise Time Entry: 28       Visit Number:  2 (including evaluation)  Planned total visits: 8 visits  Visits Authorized/Insurance Coverage:  NO AUTH, [address]ED, 90/10 [place], [place],  [address],  [facility] @ [facility] REF# [id number]     Current Problem  Problem List Items Addressed This Visit             ICD-10-CM    Left hip pain M25.552    Acute left-sided low back pain with left-sided sciatica M54.42         Precautions  Precautions  Precautions Comment: None    Pain  Pain Assessment: 0-10  0-10 (Numeric) Pain Score: 1 (Pain 5/10 driving home from work today)    Subjective  Pt notes hip has been feeling much better but low back has been painful. 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     Objective  Very TTP L SIJ    Treatment:  Therapeutic Exercise  Therapeutic Exercise Performed: Yes  Child's pose 3-way 2x30\" ea - felt okay during but pain after  Prone prop on elbows x1'  Prone press up x10 - reduced pain  Prone hip ext 2x10 ea  Quadruped UE raise x10 ea  Quadruped hip ext x10 ea   Bird dog x10   Figure 4 piriformis stretch 3x30\"       Prone SIJ gapping mobs grade III, L3-L5 PA mobs grade III  Long axis SIJ distraction with belt in prone    Current HEP:  Access Code: [email or web address]  URL: [email or web address]  Date: 12/16/2024  Prepared by: Shai Palmer    Exercises  - Supine Single Knee to Chest Stretch  - 1 x daily - 7 x weekly - 1 sets - 3 reps - 30 second hold  - Supine Piriformis Stretch with Leg Straight  - 1 x daily - 7 x weekly - 1 sets - 3 reps - 30 second hold  - Hooklying Gluteal Sets  - 1 x daily - 7 x weekly - 2 sets - 10 reps - 5 second hold  - Supine Hip Adduction Isometric with Ball  - 1 x daily - 7 x weekly - " "2 sets - 10 reps - 3 second  hold  - Hooklying Clamshell with Resistance  - 1 x daily - 7 x weekly - 2 sets - 10 reps - 5 second hold    Has patient been consistent with performance of HEP? Yes    Assessment:  Pt's response to treatment:  Pt responding well to current POC with reduced hip pain however back pain has been higher. Treatment today used manual techniques and stretching/stabilization to target low back and SIJ. Pt felt somewhat less pain in sitting following but did have pain with flexion exercises. This improved with extension exercises which were added to HEP.   Areas of improvements:  some symptom improvement within visit   Limitations/deficits:  sitting tolerance     Pain end of session: 1/10    Plan:  OP PT Plan  Treatment/Interventions: Dry needling, Electrical stimulation, Manual therapy, Neuromuscular re-education, Mechanical traction, Taping techniques, Therapeutic activities, Therapeutic exercises, Ultrasound  PT Plan: Skilled PT  PT Frequency: 1 time per week  Duration: 8 visits  Certification Period Start Date: 12/16/24  Certification Period End Date: 03/16/25  Number of Treatments Authorized: MN  Rehab Potential: Good  Plan of Care Agreement: Patient  Continue with current POC/no changes    Assessment of current progress against goals:  Progressing toward functional goals    Goals:  Active       PT Problem       STG, 4 visits:       Start:  12/16/24    Expected End:  01/30/25       Pt will be independent in HEP to improve LE and core strength, mobility, and function.  Pt will report 50% reduction in pain with functional mobility such as standing, walking, lifting, and at the end of the work day.           Pt will increase strength in L hip by 1/2 MMT in all planes for improved performance of functional mobility.         Start:  12/16/24    Expected End:  03/16/25            Pt will maintain SL bridge for >20\" on LLE without pain or core instability for improved lumbopelvic stability during " functional tasks.        Start:  12/16/24    Expected End:  03/16/25            Pt will demonstrate full L hip AROM without pain in all planes for improved performance of ADLs.         Start:  12/16/24    Expected End:  03/16/25            Pt will ambulate long community distances across all surfaces without deviation and without pain.        Start:  12/16/24    Expected End:  03/16/25            Pt will perform moderate weight lifting tasks with appropriate body mechanics and without pain for improved tolerance to work tasks.         Start:  12/16/24    Expected End:  03/16/25            Pt will tolerate >2 hours of sitting and standing activity with appropriate posture and without pain for improved tolerance to work tasks.        Start:  12/16/24    Expected End:  03/16/25

## 2025-02-03 ENCOUNTER — TREATMENT (OUTPATIENT)
Dept: PHYSICAL THERAPY | Facility: CLINIC | Age: 30
End: 2025-02-03
Payer: COMMERCIAL

## 2025-02-03 DIAGNOSIS — M54.42 ACUTE LEFT-SIDED LOW BACK PAIN WITH LEFT-SIDED SCIATICA: ICD-10-CM

## 2025-02-03 DIAGNOSIS — M25.552 LEFT HIP PAIN: ICD-10-CM

## 2025-02-03 PROCEDURE — 97140 MANUAL THERAPY 1/> REGIONS: CPT | Mod: GP

## 2025-02-03 PROCEDURE — 97110 THERAPEUTIC EXERCISES: CPT | Mod: GP

## 2025-02-03 ASSESSMENT — PAIN - FUNCTIONAL ASSESSMENT: PAIN_FUNCTIONAL_ASSESSMENT: 0-10

## 2025-02-03 ASSESSMENT — PAIN SCALES - GENERAL: PAINLEVEL_OUTOF10: 1

## 2025-02-14 NOTE — PROGRESS NOTES
"    Physical Therapy Treatment    Patient Name: Zhang Gilliam  MRN: [id number]  Encounter date:  2/17/2025  Time Calculation  Start Time: 1715  Stop Time: 1800  Time Calculation (min): 45 min     PT Therapeutic Procedures Time Entry  Manual Therapy Time Entry: 20  Therapeutic Exercise Time Entry: 25       Visit Number:  3 (including evaluation)  Planned total visits: 8 visits  Visits Authorized/Insurance Coverage:  NO AUTH, [address]ED, 90/10 [place], [place],  [address],  [facility] @ [facility] REF# [id number]     Current Problem  Problem List Items Addressed This Visit             ICD-10-CM    Left hip pain M25.552    Acute left-sided low back pain with left-sided sciatica M54.42        Precautions  Precautions  Precautions Comment: None    Pain  Pain Assessment: 0-10  0-10 (Numeric) Pain Score: 0 - No pain    Subjective  Pt notes mixed relief from last visit and from HEP. He does not think extension program makes much of a difference. Some days pain is better than others at work without specific cool.     Objective  TTP L SIJ, piriformis. Mild core instability with exercise progression.     Treatment:  Therapeutic Exercise  Therapeutic Exercise Performed: Yes  Sciatic glides 2x20\" ea  LTR green ball x15   DL bridge green ball x15   DL bridge with iso hip adduction x10   DL bridge with iso hip abduction yellow loop x10   Pallof press 2x10 ea 10#    Manual Therapy  Manual Therapy Performed: Yes  Prone SIJ gapping mobs grade III, L3-L5 PA mobs grade III  Long axis SIJ distraction with belt in prone  STM L piriformis     Current HEP:  Access Code: [email or web address]  URL: [email or web address]  Date: 12/16/2024  Prepared by: Shai Palmer    Exercises  - Supine Single Knee to Chest Stretch  - 1 x daily - 7 x weekly - 1 sets - 3 reps - 30 second hold  - Supine Piriformis Stretch with Leg Straight  - 1 x daily - 7 x weekly - 1 sets - 3 reps - 30 second hold  - Hooklying Gluteal Sets  - 1 x daily - 7 x weekly - 2 sets - 10 reps - 5 second " "hold  - Supine Hip Adduction Isometric with Ball  - 1 x daily - 7 x weekly - 2 sets - 10 reps - 3 second  hold  - Hooklying Clamshell with Resistance  - 1 x daily - 7 x weekly - 2 sets - 10 reps - 5 second hold    Has patient been consistent with performance of HEP? Yes    Assessment:  Pt's response to treatment:  Pt with better tolerance to manual techniques today, somewhat less tight following. Core weakness evident with progression but pt able to complete all without pain. Plan to buildup stabilization as tolerated moving forward.   Areas of improvements: less pain today   Limitations/deficits: work tolerance     Pain end of session: 0/10    Plan:  OP PT Plan  Treatment/Interventions: Dry needling, Electrical stimulation, Manual therapy, Neuromuscular re-education, Mechanical traction, Taping techniques, Therapeutic activities, Therapeutic exercises, Ultrasound  PT Plan: Skilled PT  PT Frequency: 1 time per week  Duration: 8 visits  Certification Period Start Date: 12/16/24  Certification Period End Date: 03/16/25  Number of Treatments Authorized: MN  Rehab Potential: Good  Plan of Care Agreement: Patient  Continue with current POC/no changes    Assessment of current progress against goals:  Progressing toward functional goals    Goals:  Active       PT Problem       STG, 4 visits:       Start:  12/16/24    Expected End:  01/30/25       Pt will be independent in HEP to improve LE and core strength, mobility, and function.  Pt will report 50% reduction in pain with functional mobility such as standing, walking, lifting, and at the end of the work day.           Pt will increase strength in L hip by 1/2 MMT in all planes for improved performance of functional mobility.         Start:  12/16/24    Expected End:  03/16/25            Pt will maintain SL bridge for >20\" on LLE without pain or core instability for improved lumbopelvic stability during functional tasks.        Start:  12/16/24    Expected End:  03/16/25  "           Pt will demonstrate full L hip AROM without pain in all planes for improved performance of ADLs.         Start:  12/16/24    Expected End:  03/16/25            Pt will ambulate long community distances across all surfaces without deviation and without pain.        Start:  12/16/24    Expected End:  03/16/25            Pt will perform moderate weight lifting tasks with appropriate body mechanics and without pain for improved tolerance to work tasks.         Start:  12/16/24    Expected End:  03/16/25            Pt will tolerate >2 hours of sitting and standing activity with appropriate posture and without pain for improved tolerance to work tasks.        Start:  12/16/24    Expected End:  03/16/25

## 2025-02-17 ENCOUNTER — TREATMENT (OUTPATIENT)
Dept: PHYSICAL THERAPY | Facility: CLINIC | Age: 30
End: 2025-02-17
Payer: COMMERCIAL

## 2025-02-17 DIAGNOSIS — M25.552 LEFT HIP PAIN: ICD-10-CM

## 2025-02-17 DIAGNOSIS — M54.42 ACUTE LEFT-SIDED LOW BACK PAIN WITH LEFT-SIDED SCIATICA: ICD-10-CM

## 2025-02-17 PROCEDURE — 97110 THERAPEUTIC EXERCISES: CPT | Mod: GP

## 2025-02-17 PROCEDURE — 97140 MANUAL THERAPY 1/> REGIONS: CPT | Mod: GP

## 2025-02-17 ASSESSMENT — PAIN - FUNCTIONAL ASSESSMENT: PAIN_FUNCTIONAL_ASSESSMENT: 0-10

## 2025-02-17 ASSESSMENT — PAIN SCALES - GENERAL: PAINLEVEL_OUTOF10: 0 - NO PAIN

## 2025-02-24 ENCOUNTER — TREATMENT (OUTPATIENT)
Dept: PHYSICAL THERAPY | Facility: CLINIC | Age: 30
End: 2025-02-24
Payer: COMMERCIAL

## 2025-02-24 DIAGNOSIS — M25.552 LEFT HIP PAIN: ICD-10-CM

## 2025-02-24 DIAGNOSIS — M54.42 ACUTE LEFT-SIDED LOW BACK PAIN WITH LEFT-SIDED SCIATICA: ICD-10-CM

## 2025-02-24 PROCEDURE — 97012 MECHANICAL TRACTION THERAPY: CPT | Mod: GP

## 2025-02-24 PROCEDURE — 97140 MANUAL THERAPY 1/> REGIONS: CPT | Mod: GP

## 2025-02-24 PROCEDURE — 97110 THERAPEUTIC EXERCISES: CPT | Mod: GP

## 2025-02-24 ASSESSMENT — PAIN - FUNCTIONAL ASSESSMENT: PAIN_FUNCTIONAL_ASSESSMENT: 0-10

## 2025-02-24 ASSESSMENT — PAIN SCALES - GENERAL: PAINLEVEL_OUTOF10: 4

## 2025-02-24 NOTE — PROGRESS NOTES
"    Physical Therapy Treatment    Patient Name: Zhang Gilliam  MRN: [id number]  Encounter date:  2/24/2025  Time Calculation  Start Time: 1716  Stop Time: 1800  Time Calculation (min): 44 min  PT Modalities Time Entry  Mechanical Traction Time Entry: 15  PT Therapeutic Procedures Time Entry  Manual Therapy Time Entry: 18  Therapeutic Exercise Time Entry: 8       Visit Number:  4 (including evaluation)  Planned total visits: 8 visits  Visits Authorized/Insurance Coverage:  NO AUTH, 550 DED, 90/10 [place],  2200 OOP,  [employer]ATE[employer] @ [facility] REF# [id number]     Current Problem  Problem List Items Addressed This Visit             ICD-10-CM    Left hip pain M25.552    Acute left-sided low back pain with left-sided sciatica M54.42       Precautions  Precautions  Precautions Comment: None    Pain  Pain Assessment: 0-10  0-10 (Numeric) Pain Score: 4    Subjective  Pain has been high at work lately, continues to flare up on and off. Pain is also present in L knee. HEP does not significantly change pain.    Objective  Very TTP L SIJ     Treatment:  Therapeutic Exercise  Therapeutic Exercise Performed: Yes  Hooklying iso hip abduction x10 10\" hold  Open book x10 ea     Manual Therapy  Manual Therapy Performed: Yes  Prone SIJ gapping mobs grade III, L3-L5 PA mobs grade III  Long axis SIJ distraction with belt in prone    Service Based Modality:    Mechanical traction:  Lumbar intermittent  Patient position:  90/90  80# on for  60 sec  45# off for  20 sec  Total time on traction:  15 min    Current HEP:  Access Code: [email or web address]  URL: [email or web address]  Date: 12/16/2024  Prepared by: Shai Palmer    Exercises  - Supine Single Knee to Chest Stretch  - 1 x daily - 7 x weekly - 1 sets - 3 reps - 30 second hold  - Supine Piriformis Stretch with Leg Straight  - 1 x daily - 7 x weekly - 1 sets - 3 reps - 30 second hold  - Hooklying Gluteal Sets  - 1 x daily - 7 x weekly - 2 sets - 10 reps - 5 second hold  - Supine Hip Adduction " "Isometric with Ball  - 1 x daily - 7 x weekly - 2 sets - 10 reps - 3 second  hold  - Hooklying Clamshell with Resistance  - 1 x daily - 7 x weekly - 2 sets - 10 reps - 5 second hold    Has patient been consistent with performance of HEP? Yes    Assessment:  Pt's response to treatment:  Pt with continued pain despite recent intervention. No pain with manual or stretches today so traction trialed. Pt with mild relief following. Pt encouraged to monitor symptoms the rest of the day to determine benefit.   Areas of improvements: some symptom relief with traction   Limitations/deficits: poor work tolerance      Pain end of session: 3/10    Plan:  OP PT Plan  Treatment/Interventions: Dry needling, Electrical stimulation, Manual therapy, Neuromuscular re-education, Mechanical traction, Taping techniques, Therapeutic activities, Therapeutic exercises, Ultrasound  PT Plan: Skilled PT  PT Frequency: 1 time per week  Duration: 8 visits  Certification Period Start Date: 12/16/24  Certification Period End Date: 03/16/25  Number of Treatments Authorized: MN  Rehab Potential: Good  Plan of Care Agreement: Patient  Continue with current POC/no changes    Assessment of current progress against goals:  Progressing toward functional goals    Goals:  Active       PT Problem       STG, 4 visits:       Start:  12/16/24    Expected End:  01/30/25       Pt will be independent in HEP to improve LE and core strength, mobility, and function.  Pt will report 50% reduction in pain with functional mobility such as standing, walking, lifting, and at the end of the work day.           Pt will increase strength in L hip by 1/2 MMT in all planes for improved performance of functional mobility.         Start:  12/16/24    Expected End:  03/16/25            Pt will maintain SL bridge for >20\" on LLE without pain or core instability for improved lumbopelvic stability during functional tasks.        Start:  12/16/24    Expected End:  03/16/25            " Pt will demonstrate full L hip AROM without pain in all planes for improved performance of ADLs.         Start:  12/16/24    Expected End:  03/16/25            Pt will ambulate long community distances across all surfaces without deviation and without pain.        Start:  12/16/24    Expected End:  03/16/25            Pt will perform moderate weight lifting tasks with appropriate body mechanics and without pain for improved tolerance to work tasks.         Start:  12/16/24    Expected End:  03/16/25            Pt will tolerate >2 hours of sitting and standing activity with appropriate posture and without pain for improved tolerance to work tasks.        Start:  12/16/24    Expected End:  03/16/25

## 2025-02-28 NOTE — PROGRESS NOTES
"    Physical Therapy Treatment    Patient Name: Zhang Gilliam  MRN: [id number]  Encounter date:  3/3/2025  Time Calculation  Start Time: 1715  Stop Time: 1800  Time Calculation (min): 45 min     PT Therapeutic Procedures Time Entry  Therapeutic Exercise Time Entry: 45       Visit Number:  5 (including evaluation)  Planned total visits: 8 visits  Visits Authorized/Insurance Coverage:  NO AUTH, 550 DED, 90/10 [place],  2200 OOP,  [employer]ATE[employer] @ [facility] REF# [id number]     Current Problem  Problem List Items Addressed This Visit             ICD-10-CM    Left hip pain M25.552    Acute left-sided low back pain with left-sided sciatica M54.42       Precautions  Precautions  Precautions Comment: None    Pain  Pain Assessment: 0-10  0-10 (Numeric) Pain Score: 3    Subjective  Symptoms have not changed much since last week. He is frustrated by consistent pain. He wants more exercises to try at home, has not yet been consistent with HEP.     Objective  Good core stability with exercise progression    Treatment:  Therapeutic Exercise  Therapeutic Exercise Performed: Yes  SKTC 2x30\"   DL bridge with iso hip adduction x10  DL bridge with iso hip abduction x10 with yellow loop  Open book x10 ea   PPT x10  + march x10  Quadruped hip abd x10 ea  Quadruped hip ext x10 ea   Bird dog x10   Prone plank 2x30\"     Standing with bands:  Row, 2x10 10#  Sh extension 2x10 10#  Pallof press 2x10 ea 10#   Sidestep yellow loop 6x20'   Monster walk 6x20'     Current HEP:  Access Code: [email or web address]  URL: [email or web address]  Date: 12/16/2024  Prepared by: Shai Palmer    Exercises  - Supine Single Knee to Chest Stretch  - 1 x daily - 7 x weekly - 1 sets - 3 reps - 30 second hold  - Supine Piriformis Stretch with Leg Straight  - 1 x daily - 7 x weekly - 1 sets - 3 reps - 30 second hold  - Hooklying Gluteal Sets  - 1 x daily - 7 x weekly - 2 sets - 10 reps - 5 second hold  - Supine Hip Adduction Isometric with Ball  - 1 x daily - 7 x weekly - 2 " "sets - 10 reps - 3 second  hold  - Hooklying Clamshell with Resistance  - 1 x daily - 7 x weekly - 2 sets - 10 reps - 5 second hold    Has patient been consistent with performance of HEP? Yes    Assessment:  Pt's response to treatment: Session focused on core stabilization and stretching to manage pain and promote better quality of mobility. Pt without increase in pain during and also had some mild relief. Pt encouraged to perform stretches/mobility exercises daily and harder stabilization exercises every other day.   Areas of improvements: good exercise and activity tolerance in visit   Limitations/deficits: pain with work       Pain end of session: 2/10    Plan:  OP PT Plan  Treatment/Interventions: Dry needling, Electrical stimulation, Manual therapy, Neuromuscular re-education, Mechanical traction, Taping techniques, Therapeutic activities, Therapeutic exercises, Ultrasound  PT Plan: Skilled PT  PT Frequency: 1 time per week  Duration: 8 visits  Certification Period Start Date: 12/16/24  Certification Period End Date: 03/16/25  Number of Treatments Authorized: MN  Rehab Potential: Good  Plan of Care Agreement: Patient  Continue with current POC/no changes    Assessment of current progress against goals:  Progressing toward functional goals    Goals:  Active       PT Problem       STG, 4 visits:       Start:  12/16/24    Expected End:  01/30/25       Pt will be independent in HEP to improve LE and core strength, mobility, and function.  Pt will report 50% reduction in pain with functional mobility such as standing, walking, lifting, and at the end of the work day.           Pt will increase strength in L hip by 1/2 MMT in all planes for improved performance of functional mobility.         Start:  12/16/24    Expected End:  03/16/25            Pt will maintain SL bridge for >20\" on LLE without pain or core instability for improved lumbopelvic stability during functional tasks.        Start:  12/16/24    Expected " End:  03/16/25            Pt will demonstrate full L hip AROM without pain in all planes for improved performance of ADLs.         Start:  12/16/24    Expected End:  03/16/25            Pt will ambulate long community distances across all surfaces without deviation and without pain.        Start:  12/16/24    Expected End:  03/16/25            Pt will perform moderate weight lifting tasks with appropriate body mechanics and without pain for improved tolerance to work tasks.         Start:  12/16/24    Expected End:  03/16/25            Pt will tolerate >2 hours of sitting and standing activity with appropriate posture and without pain for improved tolerance to work tasks.        Start:  12/16/24    Expected End:  03/16/25

## 2025-03-03 ENCOUNTER — TREATMENT (OUTPATIENT)
Dept: PHYSICAL THERAPY | Facility: CLINIC | Age: 30
End: 2025-03-03
Payer: COMMERCIAL

## 2025-03-03 DIAGNOSIS — M54.42 ACUTE LEFT-SIDED LOW BACK PAIN WITH LEFT-SIDED SCIATICA: ICD-10-CM

## 2025-03-03 DIAGNOSIS — M25.552 LEFT HIP PAIN: ICD-10-CM

## 2025-03-03 PROCEDURE — 97110 THERAPEUTIC EXERCISES: CPT | Mod: GP

## 2025-03-03 ASSESSMENT — PAIN - FUNCTIONAL ASSESSMENT: PAIN_FUNCTIONAL_ASSESSMENT: 0-10

## 2025-03-03 ASSESSMENT — PAIN SCALES - GENERAL: PAINLEVEL_OUTOF10: 3

## 2025-03-10 ENCOUNTER — TREATMENT (OUTPATIENT)
Dept: PHYSICAL THERAPY | Facility: CLINIC | Age: 30
End: 2025-03-10
Payer: COMMERCIAL

## 2025-03-10 DIAGNOSIS — M25.552 LEFT HIP PAIN: ICD-10-CM

## 2025-03-10 DIAGNOSIS — M54.42 ACUTE LEFT-SIDED LOW BACK PAIN WITH LEFT-SIDED SCIATICA: ICD-10-CM

## 2025-03-10 PROCEDURE — 97110 THERAPEUTIC EXERCISES: CPT | Mod: GP

## 2025-03-10 ASSESSMENT — PAIN - FUNCTIONAL ASSESSMENT: PAIN_FUNCTIONAL_ASSESSMENT: 0-10

## 2025-03-10 ASSESSMENT — PAIN SCALES - GENERAL: PAINLEVEL_OUTOF10: 0 - NO PAIN

## 2025-03-10 NOTE — PROGRESS NOTES
"    Physical Therapy Treatment    Patient Name: Zhang Gilliam  MRN: [id number]  Encounter date:  3/10/2025  Time Calculation  Start Time: 1717  Stop Time: 1757  Time Calculation (min): 40 min     PT Therapeutic Procedures Time Entry  Therapeutic Exercise Time Entry: 40       Visit Number:  6 (including evaluation)  Planned total visits: 8 visits  Visits Authorized/Insurance Coverage:  NO AUTH, 550 DED, 90/10 [place],  2200 OOP,  [employer] @ [facility] REF# [id number]     Current Problem  Problem List Items Addressed This Visit             ICD-10-CM    Left hip pain M25.552    Acute left-sided low back pain with left-sided sciatica M54.42       Precautions  Precautions  Precautions Comment: None    Pain  Pain Assessment: 0-10  0-10 (Numeric) Pain Score: 0 - No pain    Subjective  Pain was higher this morning but overall pt has been feeling better since last week. He has been more consistent with HEP. Work has been a little easier.     Objective  Mild lumbo pelvic instability with SL bridge     Treatment:  Therapeutic Exercise  Therapeutic Exercise Performed: Yes  SKTC 2x30\"   DL bridge with iso hip adduction x10  DL bridge with iso hip abduction x10 with red loop  SL bridge x10   DL bridge + hamstring curl x10  Open book x10 ea   Sideplank 2x30\" ea  Sideplank on knee + clamshell x15 ea   Bird dog x10     Standing:  Iso hip abduction ball against wall x10 3\"   Row, 2x10 10#  Sh extension 2x10 10#  Pallof press 2x10 ea 10#   Stir the pot 2x10 ea 10#  Sidestep yellow loop 6x20'   Monster walk 6x20'     Current HEP:  Access Code: [email or web address]  URL: [email or web address]  Date: 12/16/2024  Prepared by: Shai Palmer    Exercises  - Supine Single Knee to Chest Stretch  - 1 x daily - 7 x weekly - 1 sets - 3 reps - 30 second hold  - Supine Piriformis Stretch with Leg Straight  - 1 x daily - 7 x weekly - 1 sets - 3 reps - 30 second hold  - Hooklying Gluteal Sets  - 1 x daily - 7 x weekly - 2 sets - 10 reps - 5 second hold  - " "Supine Hip Adduction Isometric with Ball  - 1 x daily - 7 x weekly - 2 sets - 10 reps - 3 second  hold  - Hooklying Clamshell with Resistance  - 1 x daily - 7 x weekly - 2 sets - 10 reps - 5 second hold    Has patient been consistent with performance of HEP? Yes    Assessment:  Pt's response to treatment: Exercise  progressed as above. Pt challenged during and had some evident weakness however was able to complete all without any back pain. Pt encouraged to continue focus on HEP as pain has been down as a response to consistent performance.   Areas of improvements: lower pain levels since last week  Limitations/deficits: pain at work, core weakness     Pain end of session: 0/10    Plan:  OP PT Plan  Treatment/Interventions: Dry needling, Electrical stimulation, Manual therapy, Neuromuscular re-education, Mechanical traction, Taping techniques, Therapeutic activities, Therapeutic exercises, Ultrasound  PT Plan: Skilled PT  PT Frequency: 1 time per week  Duration: 8 visits  Certification Period Start Date: 12/16/24  Certification Period End Date: 03/16/25  Number of Treatments Authorized: MN  Rehab Potential: Good  Plan of Care Agreement: Patient  Continue with current POC/no changes    Assessment of current progress against goals:  Progressing toward functional goals    Goals:  Active       PT Problem       STG, 4 visits:       Start:  12/16/24    Expected End:  01/30/25       Pt will be independent in HEP to improve LE and core strength, mobility, and function.  Pt will report 50% reduction in pain with functional mobility such as standing, walking, lifting, and at the end of the work day.           Pt will increase strength in L hip by 1/2 MMT in all planes for improved performance of functional mobility.         Start:  12/16/24    Expected End:  03/16/25            Pt will maintain SL bridge for >20\" on LLE without pain or core instability for improved lumbopelvic stability during functional tasks.        Start:  " 12/16/24    Expected End:  03/16/25            Pt will demonstrate full L hip AROM without pain in all planes for improved performance of ADLs.         Start:  12/16/24    Expected End:  03/16/25            Pt will ambulate long community distances across all surfaces without deviation and without pain.        Start:  12/16/24    Expected End:  03/16/25            Pt will perform moderate weight lifting tasks with appropriate body mechanics and without pain for improved tolerance to work tasks.         Start:  12/16/24    Expected End:  03/16/25            Pt will tolerate >2 hours of sitting and standing activity with appropriate posture and without pain for improved tolerance to work tasks.        Start:  12/16/24    Expected End:  03/16/25

## 2025-03-17 ENCOUNTER — APPOINTMENT (OUTPATIENT)
Dept: PHYSICAL THERAPY | Facility: CLINIC | Age: 30
End: 2025-03-17
Payer: COMMERCIAL

## 2025-04-09 ENCOUNTER — DOCUMENTATION (OUTPATIENT)
Dept: PHYSICAL THERAPY | Facility: CLINIC | Age: 30
End: 2025-04-09
Payer: COMMERCIAL

## 2025-04-09 NOTE — PROGRESS NOTES
Discharge Summary    Name: Zhang Gilliam  MRN: [id number]  : 1995  Date: 25    Discharge Summary: PT    Discharge Information: Date of discharge 25 and Date of last visit 3/10/25    Therapy Summary: Pt not to therapy since above date. Pt canceled/did not attend any remaining sessions.    Rehab Discharge Reason: Failed to schedule and/or keep follow-up appointment(s)

## 2025-07-30 ENCOUNTER — APPOINTMENT (OUTPATIENT)
Dept: ORTHOPEDIC SURGERY | Facility: CLINIC | Age: 30
End: 2025-07-30
Payer: COMMERCIAL